# Patient Record
Sex: FEMALE | Race: BLACK OR AFRICAN AMERICAN | Employment: UNEMPLOYED | URBAN - METROPOLITAN AREA
[De-identification: names, ages, dates, MRNs, and addresses within clinical notes are randomized per-mention and may not be internally consistent; named-entity substitution may affect disease eponyms.]

---
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
## 2022-03-15 ENCOUNTER — OFFICE VISIT (OUTPATIENT)
Dept: FAMILY MEDICINE CLINIC | Age: 22
End: 2022-03-15
Payer: MEDICAID

## 2022-03-15 VITALS
OXYGEN SATURATION: 98 % | SYSTOLIC BLOOD PRESSURE: 99 MMHG | TEMPERATURE: 96.8 F | DIASTOLIC BLOOD PRESSURE: 59 MMHG | HEART RATE: 91 BPM | HEIGHT: 66 IN | RESPIRATION RATE: 16 BRPM | WEIGHT: 154 LBS | BODY MASS INDEX: 24.75 KG/M2

## 2022-03-15 DIAGNOSIS — Z23 ENCOUNTER FOR IMMUNIZATION: ICD-10-CM

## 2022-03-15 DIAGNOSIS — M54.9 ACUTE UPPER BACK PAIN: ICD-10-CM
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
## 2022-03-15 DIAGNOSIS — M54.2 NECK PAIN: Primary | ICD-10-CM

## 2022-03-15 PROCEDURE — 99213 OFFICE O/P EST LOW 20 MIN: CPT | Performed by: FAMILY MEDICINE

## 2022-03-15 PROCEDURE — 90746 HEPB VACCINE 3 DOSE ADULT IM: CPT | Performed by: FAMILY MEDICINE

## 2022-03-15 RX ORDER — CYCLOBENZAPRINE HCL 10 MG
TABLET ORAL
COMMUNITY
End: 2022-05-16

## 2022-03-15 RX ORDER — DICLOFENAC SODIUM 50 MG/1
TABLET, DELAYED RELEASE ORAL 2 TIMES DAILY
COMMUNITY
End: 2022-05-16
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
## 2022-03-17 ENCOUNTER — HOSPITAL ENCOUNTER (OUTPATIENT)
Dept: PHYSICAL THERAPY | Age: 22
Discharge: HOME | End: 2022-03-17
Payer: MEDICAID

## 2022-03-17 PROCEDURE — 97161 PT EVAL LOW COMPLEX 20 MIN: CPT | Performed by: PHYSICAL THERAPIST
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
## 2022-03-29 ENCOUNTER — HOSPITAL ENCOUNTER (OUTPATIENT)
Dept: PHYSICAL THERAPY | Age: 22
Discharge: HOME | End: 2022-03-29
Payer: MEDICAID

## 2022-03-29 PROCEDURE — 97014 ELECTRIC STIMULATION THERAPY: CPT

## 2022-03-29 PROCEDURE — 97110 THERAPEUTIC EXERCISES: CPT

## 2022-03-29 NOTE — PROGRESS NOTES
PT DAILY TREATMENT NOTE - [facility] 2-15    Patient Name: Yimi Joyner  Date:3/29/2022  : 2000  [x]  Patient  Verified  Payor: Jose Armando Varela / Plan: [employer] / Product Type: Managed Care Medicaid /    In time: 1:15P  Out time: 2:10P  Total Treatment Time (min): 55  Total Timed Codes (min): 45  1:1 Treatment Time ( only): --   Visit #:  2    Treatment Area: Neck pain [M54.2]  Other low back pain [M54.59]    SUBJECTIVE  Pain Level (0-10 scale): 5/10  Any medication changes, allergies to medications, adverse drug reactions, diagnosis change, or new procedure performed?: [x] No    [] Yes (see summary sheet for update)  Subjective functional status/changes:   [] No changes reported  Pt reported less pain since initial session.  Still feels really tight on R side of neck    OBJECTIVE    Modality rationale: decrease inflammation, decrease pain and increase tissue extensibility to improve the patients ability to decrease neck pain   Min Type Additional Details      10 [x] Estim: []Att   [x]Unatt    []TENS instruct                  [x]IFC  []Premod   []NMES                     []Other:  []w/US   []w/ice   [x]w/heat  Position:supine  Location:neck       []  Traction: [] Cervical       []Lumbar                       [] Prone          []Supine                       []Intermittent   []Continuous Lbs:  [] before manual  [] after manual  []w/heat    []  Ultrasound: []Continuous   [] Pulsed                       at: []1MHz   []3MHz Location:  W/cm2:    [] Paraffin         Location:   []w/heat    []  Ice     []  Heat  []  Ice massage Position:  Location:    []  Laser  []  Other: Position:  Location:      []  Vasopneumatic Device Pressure:       [] lo [] med [] hi   Temperature:      [x] Skin assessment post-treatment:  [x]intact []redness- no adverse reaction    []redness  adverse reaction:     45 min Therapeutic Exercise:  [x] See flow sheet :   Rationale: increase ROM, increase strength and improve coordination to improve the patients ability to increase function and mobility      With   [] TE   [] TA   [] Neuro   [] SC   [] other: Patient Education: [x] Review HEP    [] Progressed/Changed HEP based on:   [] positioning   [] body mechanics   [] transfers   [] heat/ice application    [] other:      Other Objective/Functional Measures: --     Pain Level (0-10 scale) post treatment: 0/10    ASSESSMENT/Changes in Function:   Pt tolerated session well. Advanced scapular stabilization exercises today. Advised pt she may be sore and management with ice/heat at home. Pt reported really liking the e-stim. Patient will continue to benefit from skilled PT services to modify and progress therapeutic interventions, address functional mobility deficits, address ROM deficits, address strength deficits, analyze and address soft tissue restrictions, analyze and cue movement patterns, analyze and modify body mechanics/ergonomics and assess and modify postural abnormalities to attain remaining goals. []  See Plan of Care  []  See progress note/recertification  []  See Discharge Summary         Progress towards goals / Updated goals:  Short Term Goals: To be accomplished in 2-4 treatments:               1. Pt will be compliant with initial HEP and PT attendance  Long Term Goals: To be accomplished in 10-12 treatments:               1. Pt will be able to use a vacuum  without increase in symptoms               2. Pt will be able to close at work without difficulty               3. Pt will be able to look back over her shoulder while driving without difficulty               4. Pt will be able to self-manage care using updated HEP for improved independence               5. Improved FOTO score to 72 or better to demonstrate improved function              Frequency / Duration: Patient to be seen 2 times per week for 10-12 treatments.     PLAN  [x]  Upgrade activities as tolerated     [x]  Continue plan of care  [] Update interventions per flow sheet       []  Discharge due to:_  []  Other:_      Francisco Chilel, PANCHO, OPTTOSHIA, AIB-VR 3/29/2022

## 2022-03-31 ENCOUNTER — HOSPITAL ENCOUNTER (OUTPATIENT)
Dept: PHYSICAL THERAPY | Age: 22
Discharge: HOME | End: 2022-03-31
Payer: MEDICAID

## 2022-03-31 PROCEDURE — 97110 THERAPEUTIC EXERCISES: CPT | Performed by: PHYSICAL THERAPIST

## 2022-03-31 PROCEDURE — 97014 ELECTRIC STIMULATION THERAPY: CPT | Performed by: PHYSICAL THERAPIST

## 2022-03-31 PROCEDURE — 97140 MANUAL THERAPY 1/> REGIONS: CPT | Performed by: PHYSICAL THERAPIST

## 2022-03-31 NOTE — PROGRESS NOTES
PT DAILY TREATMENT NOTE 2-15    Patient Name: Shantel Hawley  Date:3/31/2022  : 2000  [x]  Patient  Verified  Payor: Lupe Rogers / Plan: [employer] / Product Type: Managed Care Medicaid /    In time: 245p  Out time: 340p  Total Treatment Time (min): 55  Visit #:  3    Treatment Area: Neck pain [M54.2]  Other low back pain [M54.59]    SUBJECTIVE  Pain Level (0-10 scale): 4  Any medication changes, allergies to medications, adverse drug reactions, diagnosis change, or new procedure performed?: [x] No    [] Yes (see summary sheet for update)  Subjective functional status/changes:   [] No changes reported  Pain is still back and forth, but not as bad today. OBJECTIVE    Modality rationale: decrease inflammation, decrease pain and increase tissue extensibility to improve the patients ability to decrease neck pain    Min Type Additional Details      15 [x]? Estim: []? Att   [x]? Unatt    []? TENS instruct                  [x]?IFC  []? Premod   []? NMES                     []?Other:  []?w/US   []?w/ice   [x]?w/heat  Position:supine  Location:neck        []? Traction: []? Cervical       []? Lumbar                       []? Prone          []? Supine                       []?Intermittent   []? Continuous Lbs:  []? before manual  []? after manual  []?w/heat     []? Ultrasound: []? Continuous   []? Pulsed                       at: []?1MHz   []? 3MHz Location:  W/cm2:     []? Paraffin         Location:   []?w/heat     []? Ice     []? Heat  []? Ice massage Position:  Location:     []? Laser  []? Other: Position:  Location:        []? Vasopneumatic Device Pressure:       []? lo []? med []? hi   Temperature:       [x]? Skin assessment post-treatment:  [x]? intact []? redness- no adverse reaction    []? redness  adverse reaction:      32 min Therapeutic Exercise:  [x]?  See flow sheet :   Rationale: increase ROM, increase strength and improve coordination to improve the patients ability to increase function and mobility    8 min Manual Therapy: cervical distraction, upper trap, cervical paraspinal STM    Rationale: decrease pain, increase ROM, increase tissue extensibility and decrease trigger points to improve the patients ability to work without pain     With   []? TE   []? TA   []? Neuro   []? SC   []? other: Patient Education: [x]? Review HEP    []? Progressed/Changed HEP based on:   []? positioning   []? body mechanics   []? transfers   []? heat/ice application    []? other:       Other Objective/Functional Measures: --        Pain Level (0-10 scale) post treatment: 0/10     ASSESSMENT/Changes in Function:   Making good progress thus far. Gave her red and yellow TB in order to have her continue her exercises at home. Patient will continue to benefit from skilled PT services to modify and progress therapeutic interventions, address functional mobility deficits, address ROM deficits, address strength deficits, analyze and address soft tissue restrictions, analyze and cue movement patterns, analyze and modify body mechanics/ergonomics and assess and modify postural abnormalities to attain remaining goals. []? See Plan of Care  []? See progress note/recertification  []? See Discharge Summary         Progress towards goals / Updated goals:  Short Term Goals: To be accomplished in 2-4 treatments:               1. Pt will be compliant with initial HEP and PT attendance MET  Long Term Goals: To be accomplished in 10-12 treatments:               1. Pt will be able to use a vacuum  without increase in symptoms               2. Pt will be able to close at work without difficulty               3. Pt will be able to look back over her shoulder while driving without difficulty               4. Pt will be able to self-manage care using updated HEP for improved independence               5.  Improved FOTO score to 72 or better to demonstrate improved function              Frequency / Duration: Patient to be seen 2 times per week for 10-12 treatments.     PLAN  [x]? Upgrade activities as tolerated     [x]? Continue plan of care  []? Update interventions per flow sheet       []? Discharge due to:_  []?   Other:_        Tahir Todd, PT, DPT 3/31/2022

## 2022-04-05 ENCOUNTER — HOSPITAL ENCOUNTER (OUTPATIENT)
Dept: PHYSICAL THERAPY | Age: 22
Discharge: HOME | End: 2022-04-05
Payer: MEDICAID

## 2022-04-05 PROCEDURE — 97110 THERAPEUTIC EXERCISES: CPT | Performed by: PHYSICAL THERAPIST

## 2022-04-05 PROCEDURE — 97014 ELECTRIC STIMULATION THERAPY: CPT | Performed by: PHYSICAL THERAPIST

## 2022-04-05 NOTE — PROGRESS NOTES
PT DAILY TREATMENT NOTE 2-15    Patient Name: Galen Brenner  Date:2022  : 2000  [x]  Patient  Verified  Payor: Violeta Donis / Plan: [facility] MEDICAID / Product Type: Managed Care Medicaid /    In time: 128p  Out time: 223p  Total Treatment Time (min): 55  Visit #:  4    Treatment Area: Neck pain [M54.2]  Other low back pain [M54.59]    SUBJECTIVE  Pain Level (0-10 scale): 0  Any medication changes, allergies to medications, adverse drug reactions, diagnosis change, or new procedure performed?: [x] No    [] Yes (see summary sheet for update)  Subjective functional status/changes:   [] No changes reported  Doing better overall. Making progress. OBJECTIVE  Modality rationale: decrease inflammation, decrease pain and increase tissue extensibility to improve the patients ability to decrease neck pain     Min Type Additional Details      15 [x]? ? Estim: []??Att   [x]? ? Unatt    []? ?TENS instruct                  [x]? ?IFC  []? ?Premod   []? ?NMES                     []? ? Other:  []??w/US   []? ?w/ice   [x]? ?w/heat  Position:supine  Location:neck        []? ?  Traction: []?? Cervical       []? ?Lumbar                       []? ? Prone          []? ?Supine                       []? ?Intermittent   []? ? Continuous Lbs:  []?? before manual  []? ? after manual  []? ?w/heat     []? ?  Ultrasound: []??Continuous   []? ? Pulsed                       EK: []??1MHz   []? ? 3MHz Location:  W/cm2:     []? ? Paraffin         Location:   []??w/heat     []? ?  Ice     []? ?  Heat  []? ?  Ice massage Position:  Location:     []? ?  Laser  []? ?  Other: Position:  Location:        []? ?  Vasopneumatic Device Pressure:       []? ? lo []? ? med []?? hi   Temperature:       [x]? ? Skin assessment post-treatment:  [x]? ? intact []? ?redness- no adverse reaction    []? ?redness  adverse reaction:      40 min Therapeutic Exercise:  [x]? ? See flow sheet :   Rationale: increase ROM, increase strength and improve coordination to improve the patients ability to increase function and mobility     With   []?? TE   []?? TA   []? ? Neuro   []?? SC   []?? other: Patient Education: [x]? ? Review HEP    []? ? Progressed/Changed HEP based on:   []?? positioning   []? ? body mechanics   []? ? transfers   []? ? heat/ice application    []? ? other:       Other Objective/Functional Measures: --        Pain Level (0-10 scale) post treatment: 0/10     ASSESSMENT/Changes in Function:   Added scalene stretch today. Adding in new exercises without difficulty. Patient will continue to benefit from skilled PT services to modify and progress therapeutic interventions, address functional mobility deficits, address ROM deficits, address strength deficits, analyze and address soft tissue restrictions, analyze and cue movement patterns, analyze and modify body mechanics/ergonomics and assess and modify postural abnormalities to attain remaining goals.     []? ?  See Plan of Care  []? ?  See progress note/recertification  []? ?  See Discharge Summary         Progress towards goals / Updated goals:  Short Term Goals: To be accomplished in 2-4 treatments:               1. Pt will be compliant with initial HEP and PT attendance MET  Long Term Goals: To be accomplished in 10-12 treatments:               1. Pt will be able to use a vacuum  without increase in symptoms               2. Pt will be able to close at work without difficulty               3. Pt will be able to look back over her shoulder while driving without difficulty               4. Pt will be able to self-manage care using updated HEP for improved independence               5. Improved FOTO score to 72 or better to demonstrate improved function              Frequency / Duration: Patient to be seen 2 times per week for 10-12 treatments.     PLAN  [x]? ?  Upgrade activities as tolerated     [x]? ?  Continue plan of care  []? ?  Update interventions per flow sheet       []? ?  Discharge due to:_  []??  Other:Edil Johnson Linda Brito, DPT 4/5/2022

## 2022-04-07 ENCOUNTER — HOSPITAL ENCOUNTER (OUTPATIENT)
Dept: PHYSICAL THERAPY | Age: 22
Discharge: HOME | End: 2022-04-07
Payer: MEDICAID

## 2022-04-07 PROCEDURE — 97140 MANUAL THERAPY 1/> REGIONS: CPT | Performed by: PHYSICAL THERAPIST

## 2022-04-07 PROCEDURE — 97014 ELECTRIC STIMULATION THERAPY: CPT | Performed by: PHYSICAL THERAPIST

## 2022-04-07 PROCEDURE — 97110 THERAPEUTIC EXERCISES: CPT | Performed by: PHYSICAL THERAPIST

## 2022-04-07 NOTE — PROGRESS NOTES
PT DAILY TREATMENT NOTE 2-15    Patient Name: Ellen Vuong  Date:2022  : 2000  [x]  Patient  Verified  Payor: Eros Paulson / Plan: Connie Abad / Product Type: Managed Care Medicaid /    In time: 9102S  Out time: 154p  Total Treatment Time (min): 59  Visit #:  5    Treatment Area: Neck pain [M54.2]  Other low back pain [M54.59]    SUBJECTIVE  Pain Level (0-10 scale): 3  Any medication changes, allergies to medications, adverse drug reactions, diagnosis change, or new procedure performed?: [x] No    [] Yes (see summary sheet for update)  Subjective functional status/changes:   [] No changes reported  Back was bothering her more, particularly on the R side the other day. Not as bad today. OBJECTIVE      Modality rationale: decrease inflammation, decrease pain and increase tissue extensibility to improve the patients ability to decrease neck pain     Min Type Additional Details      10 [x]? ?? Estim: []? ? ? Att   [x]? ? ? Unatt    []? ??TENS instruct                  [x]? ?? IFC  []? ? ?Premod   []? ??NMES                     []? ??Other:  []???w/US   []? ??w/ice   [x]? ??w/heat  Position:supine  Location:neck        []? ??  Traction: []??? Cervical       []? ? ?Lumbar                       []? ?? Prone          []? ? ?Supine                       []? ? ? Intermittent   []? ?? Continuous Lbs:  []??? before manual  []? ?? after manual  []? ??w/heat     []? ??  Ultrasound: []? ? ? Continuous   []? ?? Pulsed                       at: []???1MHz   []? ??3MHz Location:  W/cm2:     []??? Paraffin         Location:   []???w/heat     []? ??  Ice     []? ??  Heat  []? ??  Ice massage Position:  Location:     []? ??  Laser  []? ??  Other: Position:  Location:        []? ??  Vasopneumatic Device Pressure:       []? ?? lo []? ?? med []? ?? hi   Temperature:       [x]? ?? Skin assessment post-treatment:  [x]? ??intact []? ??redness- no adverse reaction    []? ??redness  adverse reaction:      39 min Therapeutic Exercise:  [x]? ?? See flow sheet :   Rationale: increase ROM, increase strength and improve coordination to improve the patients ability to increase function and mobility    10 min Manual Therapy: cervical distraction, upper trap, cervical paraspinal STM, downsloping glides mid and lower R side, upsloping glides mid L side    Rationale: decrease pain, increase ROM, increase tissue extensibility and decrease trigger points to improve the patients ability to work without pain     With   []??? TE   []??? TA   []??? Neuro   []??? SC   []? ?? other: Patient Education: [x]??? Review HEP    []? ?? Progressed/Changed HEP based on:   []??? positioning   []? ?? body mechanics   []? ?? transfers   []? ?? heat/ice application    []? ?? other:       Other Objective/Functional Measures: --        Pain Level (0-10 scale) post treatment: 0/10     ASSESSMENT/Changes in Function:   Felt good stretch with the QL doorway stretch, so will have her practice at home. Some possible facet joint dysfunction in mid L cervical segments, and will have her work on the towel rotation stretch at home. Patient will continue to benefit from skilled PT services to modify and progress therapeutic interventions, address functional mobility deficits, address ROM deficits, address strength deficits, analyze and address soft tissue restrictions, analyze and cue movement patterns, analyze and modify body mechanics/ergonomics and assess and modify postural abnormalities to attain remaining goals.     []? ??  See Plan of Care  []? ??  See progress note/recertification  []? ??  See Discharge Summary         Progress towards goals / Updated goals:  Short Term Goals: To be accomplished in 2-4 treatments:               1. Pt will be compliant with initial HEP and PT attendance MET  Long Term Goals: To be accomplished in 10-12 treatments:               1.  Pt will be able to use a vacuum  without increase in symptoms               2. Pt will be able to close at work without difficulty PROGRESSING               3. Pt will be able to look back over her shoulder while driving without difficulty               4. Pt will be able to self-manage care using updated HEP for improved independence               5. Improved FOTO score to 72 or better to demonstrate improved function              Frequency / Duration: Patient to be seen 2 times per week for 10-12 treatments.     PLAN  [x]???  Upgrade activities as tolerated     [x]? ??  Continue plan of care  []? ??  Update interventions per flow sheet       []???  Discharge due to:_  []???  Other:_          Neli Jacob, PT, DPT 4/7/2022

## 2022-04-14 ENCOUNTER — HOSPITAL ENCOUNTER (OUTPATIENT)
Dept: PHYSICAL THERAPY | Age: 22
Discharge: HOME | End: 2022-04-14
Payer: MEDICAID

## 2022-04-14 PROCEDURE — 97140 MANUAL THERAPY 1/> REGIONS: CPT | Performed by: PHYSICAL THERAPIST

## 2022-04-14 PROCEDURE — 97014 ELECTRIC STIMULATION THERAPY: CPT | Performed by: PHYSICAL THERAPIST

## 2022-04-14 PROCEDURE — 97110 THERAPEUTIC EXERCISES: CPT | Performed by: PHYSICAL THERAPIST

## 2022-04-14 NOTE — PROGRESS NOTES
PT DAILY TREATMENT NOTE 2-15    Patient Name: Benton Soto  KWJQ:  : 2000  [x]  Patient  Verified  Payor: Ramez Gonzalez / Plan: Baron Viveros / Product Type: Managed Care Medicaid /    In time: 119p  Out time: 215p  Total Treatment Time (min): 56  Visit #:  6    Treatment Area: Neck pain [M54.2]  Other low back pain [M54.59]    SUBJECTIVE  Pain Level (0-10 scale): 2  Any medication changes, allergies to medications, adverse drug reactions, diagnosis change, or new procedure performed?: [x] No    [] Yes (see summary sheet for update)  Subjective functional status/changes:   [] No changes reported  Feeling better. She is having less pain day over day. OBJECTIVE    Modality rationale: decrease inflammation, decrease pain and increase tissue extensibility to improve the patients ability to decrease neck pain     Min Type Additional Details      15 [x]???? Estim: []?? ?? Att   [x]? ???Unatt    []????TENS instruct                  [x]? ???IFC  []????Premod   []????NMES                     []?? ??Other:  []????w/US   []? ???w/ice   [x]? ???w/heat  Position:supine  Location:neck        []????  Traction: []???? Cervical       []????Lumbar                       []???? Prone          []????Supine                       []?? ?? Intermittent   []???? Continuous Lbs:  []???? before manual  []???? after manual  []? ???w/heat     []????  Ultrasound: []???? Continuous   []???? Pulsed                       at: []????1MHz   []????3MHz Location:  W/cm2:     []???? Paraffin         Location:   []????w/heat     []????  Ice     []????  Heat  []????  Ice massage Position:  Location:     []????  Laser  []????  Other: Position:  Location:        []????  Vasopneumatic Device Pressure:       []???? lo []???? med []???? hi   Temperature:       [x]? ??? Skin assessment post-treatment:  [x]? ???intact []? ???redness- no adverse reaction    []? ???redness  adverse reaction:      31 min Therapeutic Exercise:  [x]? ??? See flow sheet :   Rationale: increase ROM, increase strength and improve coordination to improve the patients ability to increase function and mobility     10 min Manual Therapy: cervical distraction, upper trap, cervical paraspinal STM, downsloping glides mid and lower R side, upsloping glides mid L side    Rationale: decrease pain, increase ROM, increase tissue extensibility and decrease trigger points to improve the patients ability to work without pain      With   []???? TE   []???? TA   []???? Neuro   []???? SC   []???? other: Patient Education: [x]???? Review HEP    []???? Progressed/Changed HEP based on:   []???? positioning   []???? body mechanics   []???? transfers   []???? heat/ice application    []???? other:       Other Objective/Functional Measures: Rotation: 70deg bilaterally        Pain Level (0-10 scale) post treatment: 0/10     ASSESSMENT/Changes in Function:   Showing good improvements overall. Will decrease frequency to 1x/weekly starting in 2 weeks for 2 further appointments. Patient will continue to benefit from skilled PT services to modify and progress therapeutic interventions, address functional mobility deficits, address ROM deficits, address strength deficits, analyze and address soft tissue restrictions, analyze and cue movement patterns, analyze and modify body mechanics/ergonomics and assess and modify postural abnormalities to attain remaining goals.     []????  See Plan of Care  []????  See progress note/recertification  []????  See Discharge Summary         Progress towards goals / Updated goals:  Short Term Goals: To be accomplished in 2-4 treatments:               1. Pt will be compliant with initial HEP and PT attendance MET  Long Term Goals: To be accomplished in 10-12 treatments:               1.  Pt will be able to use a vacuum  without increase in symptoms               2. Pt will be able to close at work without difficulty ([phone]. Pt will be able to look back over her shoulder while driving without difficulty MET               4. Pt will be able to self-manage care using updated HEP for improved independence MET               5. Improved FOTO score to 72 or better to demonstrate improved function              Frequency / Duration: Patient to be seen 2 times per week for 10-12 treatments.     PLAN  [x]????  Upgrade activities as tolerated     [x]? ???  Continue plan of care  []????  Update interventions per flow sheet       []????  Discharge due to:_  []????  Other:_          Myriam Coronado, PT, DPT 4/14/2022

## 2022-04-26 ENCOUNTER — HOSPITAL ENCOUNTER (OUTPATIENT)
Dept: PHYSICAL THERAPY | Age: 22
Discharge: HOME | End: 2022-04-26
Payer: MEDICAID

## 2022-04-26 PROCEDURE — 97110 THERAPEUTIC EXERCISES: CPT | Performed by: PHYSICAL THERAPIST

## 2022-04-26 PROCEDURE — 97014 ELECTRIC STIMULATION THERAPY: CPT | Performed by: PHYSICAL THERAPIST

## 2022-04-26 PROCEDURE — 97140 MANUAL THERAPY 1/> REGIONS: CPT | Performed by: PHYSICAL THERAPIST

## 2022-04-26 NOTE — PROGRESS NOTES
PT DAILY TREATMENT NOTE 2-15    Patient Name: Pablo Apley  Date:2022  : 2000  [x]  Patient  Verified  Payor: Santa Lazcano / Plan: [id number] [employer] / Product Type: Managed Care Medicaid /    In time: 517  Out time:   Total Treatment Time (min): 63  Visit #:  7    Treatment Area: Neck pain [M54.2]  Other low back pain [M54.59]    SUBJECTIVE  Pain Level (0-10 scale): 2  Any medication changes, allergies to medications, adverse drug reactions, diagnosis change, or new procedure performed?: [x] No    [] Yes (see summary sheet for update)  Subjective functional status/changes:   [] No changes reported  About 80-90% better at this time. OBJECTIVE    Modality rationale: decrease inflammation, decrease pain and increase tissue extensibility to improve the patients ability to decrease neck pain     Min Type Additional Details      15 [x]????? Estim: []??? ? ? Att   [x]??? ?? Unatt    []?????TENS instruct                  [x]??? ? ?IFC  []??? ? ? Premod   []??? ??NMES                     []??? ?? Other:  []?????w/US   []?????w/ice   [x]?????w/heat  Position:supine  Location:neck        []?????  Traction: []????? Cervical       []??? ? ?Lumbar                       []????? Prone          []??? ? ? Supine                       []??? ? ? Intermittent   []??? ? ? Continuous Lbs:  []????? before manual  []????? after manual  []?????w/heat     []?????  Ultrasound: []??? ? ? Continuous   []????? Pulsed                       at: []?????1MHz   []?????3MHz Location:  W/cm2:     []????? Paraffin         Location:   []?????w/heat     []?????  Ice     []?????  Heat  []?????  Ice massage Position:  Location:     []?????  Laser  []?????  Other: Position:  Location:        []?????  Vasopneumatic Device Pressure:       []????? lo []????? med []????? hi   Temperature:       [x]????? Skin assessment post-treatment:  [x]? ????intact []?????redness- no adverse reaction    []?????redness  adverse reaction:      38 min Therapeutic Exercise:  [x]? ???? See flow sheet :   Rationale: increase ROM, increase strength and improve coordination to improve the patients ability to increase function and mobility     10 min Manual Therapy: cervical distraction, upper trap, cervical paraspinal STM, downsloping glides mid and lower R side, upsloping glides mid L side    Rationale: decrease pain, increase ROM, increase tissue extensibility and decrease trigger points to improve the patients ability to work without pain      With   []????? TE   []????? TA   []????? Neuro   []????? SC   []????? other: Patient Education: [x]????? Review HEP    []????? Progressed/Changed HEP based on:   []????? positioning   []????? body mechanics   []????? transfers   []????? heat/ice application    []????? other:       Other Objective/Functional Measures: ---      Pain Level (0-10 scale) post treatment: 0/10     ASSESSMENT/Changes in Function:   Continuing to do well; will plan to d/c after next session. Patient will continue to benefit from skilled PT services to modify and progress therapeutic interventions, address functional mobility deficits, address ROM deficits, address strength deficits, analyze and address soft tissue restrictions, analyze and cue movement patterns, analyze and modify body mechanics/ergonomics and assess and modify postural abnormalities to attain remaining goals.     []?????  See Plan of Care  []?????  See progress note/recertification  []?????  See Discharge Summary         Progress towards goals / Updated goals:  Short Term Goals: To be accomplished in 2-4 treatments:               1. Pt will be compliant with initial HEP and PT attendance MET  Long Term Goals: To be accomplished in 10-12 treatments:               1.  Pt will be able to use a vacuum  without increase in symptoms               2. Pt will be able to close at work without difficulty MET  [id number]. Pt will be able to look back over her shoulder while driving without difficulty MET               4. Pt will be able to self-manage care using updated HEP for improved independence MET               5.  Improved FOTO score to 72 or better to demonstrate improved function              Frequency / Duration: Patient to be seen 2 times per week for 10-12 treatments.     PLAN  [x]?????  Upgrade activities as tolerated     [x]?????  Continue plan of care  []?????  Update interventions per flow sheet       []?????  Discharge due to:_  []?????  Other:_             Connie Decker, PT, DPT 4/26/2022

## 2022-05-03 ENCOUNTER — HOSPITAL ENCOUNTER (OUTPATIENT)
Dept: PHYSICAL THERAPY | Age: 22
Discharge: HOME | End: 2022-05-03
Payer: MEDICAID

## 2022-05-03 PROCEDURE — 97110 THERAPEUTIC EXERCISES: CPT | Performed by: PHYSICAL THERAPIST

## 2022-05-03 PROCEDURE — 97014 ELECTRIC STIMULATION THERAPY: CPT | Performed by: PHYSICAL THERAPIST

## 2022-05-03 PROCEDURE — 97140 MANUAL THERAPY 1/> REGIONS: CPT | Performed by: PHYSICAL THERAPIST
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
## 2022-05-03 NOTE — PROGRESS NOTES
PT DAILY TREATMENT NOTE 2-15    Patient Name: Lele Ball  Date:5/3/2022  : 2000  [x]  Patient  Verified  Payor: Adam Comment / Plan: [id number] [employer] / Product Type: Managed Care Medicaid /    In time: 114p  Out time: 204p  Total Treatment Time (min): 50  Visit #:  8    Treatment Area: Neck pain [M54.2]  Other low back pain [M54.59]    SUBJECTIVE  Pain Level (0-10 scale): 0  Any medication changes, allergies to medications, adverse drug reactions, diagnosis change, or new procedure performed?: [x] No    [] Yes (see summary sheet for update)  Subjective functional status/changes:   [] No changes reported  Doing good. No pain over her vacation over the weekend, even at the water park and zipline. OBJECTIVE         Modality rationale: decrease inflammation, decrease pain and increase tissue extensibility to improve the patients ability to decrease neck pain     Min Type Additional Details      15 [x]?????? Estim: []???? ? ? Att   [x]???? ?? Unatt    []??????TENS instruct                  [x]??????IFC  []?????? Premod   []??????NMES                     []?????? Other:  []??????w/US   []??????w/ice   [x]??????w/heat  Position:supine  Location:neck        []??????  Traction: []?????? Cervical       []??????Lumbar                       []?????? Prone          []?????? Supine                       []???? ? ? Intermittent   []?????? Continuous Lbs:  []?????? before manual  []?????? after manual  []??????w/heat     []??????  Ultrasound: []?????? Continuous   []?????? Pulsed                       at: []??????1MHz   []??????3MHz Location:  W/cm2:     []?????? Paraffin         Location:   []??????w/heat     []??????  Ice     []??????  Heat  []??????  Ice massage Position:  Location:     []??????  Laser  []??????  Other: Position:  Location:        []??????  Vasopneumatic Device Pressure:       []?????? lo []?????? med []?????? hi   Temperature:       [x]?????? Skin assessment post-treatment:  [x]??????intact []??????redness- no adverse reaction    []??????redness  adverse reaction:      25 min Therapeutic Exercise:  [x]?????? See flow sheet :   Rationale: increase ROM, increase strength and improve coordination to improve the patients ability to increase function and mobility     10 min Manual Therapy: cervical distraction, upper trap, cervical paraspinal STM, downsloping glides mid and lower R side, upsloping glides mid L side    Rationale: decrease pain, increase ROM, increase tissue extensibility and decrease trigger points to improve the patients ability to work without pain      With   []?????? TE   []?????? TA   []?????? Neuro   []?????? SC   []?????? other: Patient Education: [x]?????? Review HEP    []?????? Progressed/Changed HEP based on:   []?????? positioning   []?????? body mechanics   []?????? transfers   []?????? heat/ice application    []?????? other:       Other Objective/Functional Measures: FOTO: 78     Pain Level (0-10 scale) post treatment: 0/10     ASSESSMENT/Changes in Function:   []??????  See progress note/recertification  [x]??????  See Discharge Summary         PLAN  []??????  Upgrade activities as tolerated     []??????  Continue plan of care  []??????  Update interventions per flow sheet       [x]??????  Discharge due to: progress  []??????  Other:Edil Quinteros Kirk, PT, DPT 5/3/2022

## 2022-05-16 ENCOUNTER — OFFICE VISIT (OUTPATIENT)
Dept: FAMILY MEDICINE CLINIC | Age: 22
End: 2022-05-16
Payer: MEDICAID

## 2022-05-16 VITALS
SYSTOLIC BLOOD PRESSURE: 106 MMHG | WEIGHT: 154 LBS | OXYGEN SATURATION: 96 % | HEART RATE: 71 BPM | HEIGHT: 66 IN | DIASTOLIC BLOOD PRESSURE: 64 MMHG | RESPIRATION RATE: 16 BRPM | BODY MASS INDEX: 24.75 KG/M2 | TEMPERATURE: 98.3 F

## 2022-05-16 DIAGNOSIS — Z78.9 NOT IMMUNE TO HEPATITIS B VIRUS: ICD-10-CM

## 2022-05-16 DIAGNOSIS — Z13.1 SCREENING FOR DIABETES MELLITUS: ICD-10-CM

## 2022-05-16 DIAGNOSIS — N94.6 DYSMENORRHEA IN ADOLESCENT: ICD-10-CM

## 2022-05-16 DIAGNOSIS — Z13.6 SCREENING FOR CARDIOVASCULAR CONDITION: ICD-10-CM

## 2022-05-16 DIAGNOSIS — E55.9 VITAMIN D DEFICIENCY: Primary | ICD-10-CM

## 2022-05-16 PROCEDURE — 99213 OFFICE O/P EST LOW 20 MIN: CPT | Performed by: FAMILY MEDICINE
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
## 2022-05-16 NOTE — PROGRESS NOTES
2022   Bibiana Erickson (: 2000) is a 25 y.o. female, established patient, here for evaluation of the following chief complaint(s):  Follow-up ([facility]) and Labs (Need to have Hep B test done- 2 month f/u visit )     ASSESSMENT/PLAN:  Below is the assessment and plan developed based on review of pertinent history, physical exam, labs, studies, and medications. 1. Vitamin D deficiency  -     VITAMIN D, 25 HYDROXY; Future  2. Dysmenorrhea in adolescent  -     THYROID CASCADE PROFILE; Future  -     URINALYSIS W/ REFLEX CULTURE; Future  -     CBC WITH AUTOMATED DIFF; Future  -     METABOLIC PANEL, COMPREHENSIVE; Future  3. Screening for cardiovascular condition  -     LIPID PANEL; Future  4. Screening for diabetes mellitus  -     METABOLIC PANEL, COMPREHENSIVE; Future  5. Not immune to hepatitis B virus  -     HEPATITIS B SURF AB QUANT    Return in about 1 year (around 2023) for follow up. SUBJECTIVE/OBJECTIVE:  HPI   1. Vitamin D deficiency  Check vitamin D levels    2. Dysmenorrhea in adolescent  Patient continues to have dysmenorrhea and heavy cycle. She has stopped taking OCPs. LMP: 2022. I have advised her to start iron supplement. I will check labs. 3. Screening for cardiovascular condition  Check cholesterol    4. Screening for diabetes mellitus  Check labs    5. Not immune to hepatitis B virus  Patient had 3 series of hepatitis B vaccine, after that had hepatitis B immunity was low, she had a hepatitis B booster. She is here to get hepatitis B immunity checked again. No results found for any visits on 22. Review of Systems   Constitutional: Negative. HENT: Negative. Eyes: Negative. Respiratory: Negative. Cardiovascular: Negative. Gastrointestinal: Negative. Genitourinary: Negative. Musculoskeletal: Negative. Skin: Negative. Neurological: Negative. Endo/Heme/Allergies: Negative. Psychiatric/Behavioral: Negative.         Physical Exam  Vitals and nursing note reviewed. HENT:      Head: Normocephalic and atraumatic. Right Ear: External ear normal.      Left Ear: External ear normal.      Nose: Nose normal.   Eyes:      Conjunctiva/sclera: Conjunctivae normal.   Cardiovascular:      Rate and Rhythm: Normal rate and regular rhythm. Pulmonary:      Effort: Pulmonary effort is normal.      Breath sounds: Normal breath sounds. Abdominal:      General: Bowel sounds are normal. There is no distension. Palpations: Abdomen is soft. Tenderness: There is no abdominal tenderness. Musculoskeletal:         General: Normal range of motion. Cervical back: Normal range of motion and neck supple. Right lower leg: No edema. Left lower leg: No edema. Skin:     General: Skin is warm and dry. Neurological:      General: No focal deficit present. Mental Status: She is alert. /64 (BP 1 Location: Left upper arm, BP Patient Position: Sitting, BP Cuff Size: Adult)   Pulse 71   Temp 98.3 °F (36.8 °C) (Temporal)   Resp 16   Ht 5' 6\" (1.676 m)   Wt 154 lb (69.9 kg)   LMP 05/02/2022   SpO2 96%   BMI 24.86 kg/m²     Allergies   Allergen Reactions    Pollens Extract Itching       No current outpatient medications on file. No current facility-administered medications for this visit. Past Medical History:   Diagnosis Date    Other ill-defined conditions(799.89)     bronchitis       No past surgical history on file. Social History:  reports that she has never smoked. She has never used smokeless tobacco. She reports that she does not drink alcohol and does not use drugs. Patient Care Team:  Merly Goodson MD as PCP - General (Family Medicine)  Merly Goodson MD as PCP - [facility] Provider    Problem List  Date Reviewed: 5/16/2022    None               I ADVISED PATIENT TO GO TO ER IF SYMPTOMS WORSEN , CHANGE OR FAILS TO IMPROVE.     I have discussed the diagnosis with the patient and the intended plan as seen in the above orders. The patient has received an after-visit summary and questions were answered concerning future plans. I have discussed medication side effects and warnings with the patient as well. The patient agrees and understands above plan. An electronic signature was used to authenticate this note.   -- Tee Ulloa MD

## 2022-05-17 LAB
25(OH)D3+25(OH)D2 SERPL-MCNC: 33.1 NG/ML (ref 30–100)
ALBUMIN SERPL-MCNC: 3.8 G/DL (ref 3.9–5)
ALBUMIN/GLOB SERPL: 1 {RATIO} (ref 1.2–2.2)
ALP SERPL-CCNC: 70 IU/L (ref 42–106)
ALT SERPL-CCNC: 10 IU/L (ref 0–32)
APPEARANCE UR: CLEAR
AST SERPL-CCNC: 14 IU/L (ref 0–40)
BACTERIA #/AREA URNS HPF: NORMAL /[HPF]
BASOPHILS # BLD AUTO: 0 X10E3/UL (ref 0–0.2)
BASOPHILS NFR BLD AUTO: 1 %
BILIRUB SERPL-MCNC: 0.8 MG/DL (ref 0–1.2)
BILIRUB UR QL STRIP: NEGATIVE
BUN SERPL-MCNC: 5 MG/DL (ref 6–20)
BUN/CREAT SERPL: 8 (ref 9–23)
CALCIUM SERPL-MCNC: 9.8 MG/DL (ref 8.7–10.2)
CASTS URNS QL MICRO: NORMAL /LPF
CHLORIDE SERPL-SCNC: 100 MMOL/L (ref 96–106)
CHOLEST SERPL-MCNC: 161 MG/DL (ref 100–169)
CO2 SERPL-SCNC: 23 MMOL/L (ref 20–29)
COLOR UR: YELLOW
CREAT SERPL-MCNC: 0.59 MG/DL (ref 0.57–1)
EGFR: 133 ML/MIN/1.73
EOSINOPHIL # BLD AUTO: 0.1 X10E3/UL (ref 0–0.4)
EOSINOPHIL NFR BLD AUTO: 2 %
EPI CELLS #/AREA URNS HPF: NORMAL /HPF (ref 0–10)
ERYTHROCYTE [DISTWIDTH] IN BLOOD BY AUTOMATED COUNT: 13.1 % (ref 11.7–15.4)
GLOBULIN SER CALC-MCNC: 3.9 G/DL (ref 1.5–4.5)
GLUCOSE SERPL-MCNC: 78 MG/DL (ref 65–99)
GLUCOSE UR QL STRIP: NEGATIVE
HBV SURFACE AB SER-ACNC: 23.1 MIU/ML
HCT VFR BLD AUTO: 41.7 % (ref 34–46.6)
HDLC SERPL-MCNC: 77 MG/DL
HGB BLD-MCNC: 13.9 G/DL (ref 11.1–15.9)
HGB UR QL STRIP: NEGATIVE
IMM GRANULOCYTES # BLD AUTO: 0 X10E3/UL (ref 0–0.1)
IMM GRANULOCYTES NFR BLD AUTO: 0 %
KETONES UR QL STRIP: ABNORMAL
LDLC SERPL CALC-MCNC: 74 MG/DL (ref 0–109)
LEUKOCYTE ESTERASE UR QL STRIP: NEGATIVE
LYMPHOCYTES # BLD AUTO: 2.6 X10E3/UL (ref 0.7–3.1)
LYMPHOCYTES NFR BLD AUTO: 52 %
MCH RBC QN AUTO: 28.7 PG (ref 26.6–33)
MCHC RBC AUTO-ENTMCNC: 33.3 G/DL (ref 31.5–35.7)
MCV RBC AUTO: 86 FL (ref 79–97)
MICRO URNS: ABNORMAL
MICRO URNS: ABNORMAL
MONOCYTES # BLD AUTO: 0.3 X10E3/UL (ref 0.1–0.9)
MONOCYTES NFR BLD AUTO: 6 %
NEUTROPHILS # BLD AUTO: 2 X10E3/UL (ref 1.4–7)
NEUTROPHILS NFR BLD AUTO: 39 %
NITRITE UR QL STRIP: NEGATIVE
PH UR STRIP: 7 [PH] (ref 5–7.5)
PLATELET # BLD AUTO: 270 X10E3/UL (ref 150–450)
POTASSIUM SERPL-SCNC: 4 MMOL/L (ref 3.5–5.2)
PROT SERPL-MCNC: 7.7 G/DL (ref 6–8.5)
PROT UR QL STRIP: NEGATIVE
RBC # BLD AUTO: 4.84 X10E6/UL (ref 3.77–5.28)
RBC #/AREA URNS HPF: NORMAL /HPF (ref 0–2)
SODIUM SERPL-SCNC: 137 MMOL/L (ref 134–144)
SP GR UR STRIP: 1.02 (ref 1–1.03)
TRIGL SERPL-MCNC: 49 MG/DL (ref 0–89)
TSH SERPL DL<=0.005 MIU/L-ACNC: 1.34 UIU/ML (ref 0.45–4.5)
URINALYSIS REFLEX, 377202: ABNORMAL
UROBILINOGEN UR STRIP-MCNC: 1 MG/DL (ref 0.2–1)
VLDLC SERPL CALC-MCNC: 10 MG/DL (ref 5–40)
WBC # BLD AUTO: 5 X10E3/UL (ref 3.4–10.8)
WBC #/AREA URNS HPF: NORMAL /HPF (ref 0–5)

## 2022-11-16 ENCOUNTER — OFFICE VISIT (OUTPATIENT)
Dept: FAMILY MEDICINE CLINIC | Age: 22
End: 2022-11-16
Payer: MEDICAID

## 2022-11-16 VITALS
HEIGHT: 66 IN | HEART RATE: 68 BPM | RESPIRATION RATE: 20 BRPM | BODY MASS INDEX: 24.85 KG/M2 | TEMPERATURE: 98.2 F | WEIGHT: 154.6 LBS | DIASTOLIC BLOOD PRESSURE: 65 MMHG | OXYGEN SATURATION: 98 % | SYSTOLIC BLOOD PRESSURE: 103 MMHG

## 2022-11-16 DIAGNOSIS — M62.830 BACK MUSCLE SPASM: Primary | ICD-10-CM

## 2022-11-16 PROCEDURE — 99213 OFFICE O/P EST LOW 20 MIN: CPT | Performed by: FAMILY MEDICINE

## 2022-11-16 NOTE — PROGRESS NOTES
2022   Kale Paulson (: 2000) is a 23 y.o. female, established patient, here for evaluation of the following chief complaint(s):  Follow-up (6 months follow up and right side muscle spasm only when stretching - Started 2 weeks ago on and off )     ASSESSMENT/PLAN:  Below is the assessment and plan developed based on review of pertinent history, physical exam, labs, studies, and medications. 1. Back muscle spasm  -     REFERRAL TO PHYSICAL THERAPY  -     XR SPINE LUMB 2 OR 3 V; Future  -     METABOLIC PANEL, COMPREHENSIVE; Future    Return in about 3 months (around 2023) for follow up. SUBJECTIVE/OBJECTIVE:  HPI     1. Back muscle spasm  Patient reports intermittent muscle spasms in thoracic back. This started 2 weeks ago. Spasms is only for a few seconds and then resolves on its own. Patient is a dental assistant. She reports sometimes she does not have a correct posture that causes her muscle spasms. She denies any trauma fall injury or lifting, pushing or pulling any heavy objects. Pain does not radiate. Patient has not tried any OTC medications. I will refer to PT. She declines Flexeril or any muscle relaxers. I will check x-rays. No results found for any visits on 22. Review of Systems   Constitutional: Negative. HENT: Negative. Eyes: Negative. Respiratory: Negative. Cardiovascular: Negative. Gastrointestinal: Negative. Genitourinary: Negative. Musculoskeletal:  Positive for myalgias. Skin: Negative. Neurological: Negative. Endo/Heme/Allergies: Negative. Psychiatric/Behavioral: Negative. Physical Exam  Vitals and nursing note reviewed. HENT:      Head: Normocephalic and atraumatic. Right Ear: External ear normal.      Left Ear: External ear normal.      Nose: Nose normal.   Eyes:      Conjunctiva/sclera: Conjunctivae normal.   Cardiovascular:      Rate and Rhythm: Normal rate and regular rhythm. Pulmonary:      Effort: Pulmonary effort is normal.      Breath sounds: Normal breath sounds. Abdominal:      General: Bowel sounds are normal. There is no distension. Palpations: Abdomen is soft. Tenderness: There is no abdominal tenderness. Musculoskeletal:         General: Normal range of motion. Cervical back: Normal, normal range of motion and neck supple. Thoracic back: Spasms present. Lumbar back: Normal.   Skin:     General: Skin is warm and dry. Neurological:      General: No focal deficit present. Mental Status: She is alert. /65 (BP 1 Location: Right arm, BP Patient Position: Sitting, BP Cuff Size: Small adult)   Pulse 68   Temp 98.2 °F (36.8 °C) (Temporal)   Resp 20   Ht 5' 6\" (1.676 m)   Wt 154 lb 9.6 oz (70.1 kg)   SpO2 98%   BMI 24.95 kg/m²     Allergies   Allergen Reactions    Pollens Extract Itching       No current outpatient medications on file. No current facility-administered medications for this visit. Past Medical History:   Diagnosis Date    Other ill-defined conditions(799.89)     bronchitis       History reviewed. No pertinent surgical history. Social History:  reports that she has never smoked. She has never used smokeless tobacco. She reports that she does not drink alcohol and does not use drugs. Patient Care Team:  Mortimer Arrow, MD as PCP - General (Family Medicine)  Mortimer Arrow, MD as PCP - [facility]    Problem List  Date Reviewed: 11/16/2022   None           I ADVISED PATIENT TO GO TO ER IF SYMPTOMS WORSEN , CHANGE OR FAILS TO IMPROVE. I have discussed the diagnosis with the patient and the intended plan as seen in the above orders. The patient has received an after-visit summary and questions were answered concerning future plans. I have discussed medication side effects and warnings with the patient as well. The patient agrees and understands above plan.        An electronic signature was used to authenticate this note.   -- Son Miller MD

## 2022-11-16 NOTE — PROGRESS NOTES
Patient stated name &   Chief Complaint   Patient presents with    Follow-up     6 months follow up and right side muscle spasm only when stretching - Started 2 weeks ago on and off         Health Maintenance Due   Topic    Hepatitis C Screening     COVID-19 Vaccine (1)    Flu Vaccine (1)       Wt Readings from Last 3 Encounters:   22 154 lb 9.6 oz (70.1 kg)   22 154 lb (69.9 kg) (83 %, Z= 0.97)*   03/15/22 154 lb (69.9 kg) (84 %, Z= 0.98)*     * Growth percentiles are based on CDC (Girls, 2-20 Years) data. Temp Readings from Last 3 Encounters:   22 98.2 °F (36.8 °C) (Temporal)   22 98.3 °F (36.8 °C) (Temporal)   03/15/22 96.8 °F (36 °C) (Skin)     BP Readings from Last 3 Encounters:   22 103/65   22 106/64   03/15/22 (!) 99/59     Pulse Readings from Last 3 Encounters:   22 68   22 71   03/15/22 91         Learning Assessment:  :     Learning Assessment 10/1/2019   PRIMARY LEARNER Patient   HIGHEST LEVEL OF EDUCATION - PRIMARY LEARNER  GRADUATED HIGH SCHOOL OR GED   BARRIERS PRIMARY LEARNER NONE   CO-LEARNER CAREGIVER No   PRIMARY LANGUAGE ENGLISH    NEED No   LEARNER PREFERENCE PRIMARY OTHER (COMMENT)   LEARNING SPECIAL TOPICS no   ANSWERED BY self   RELATIONSHIP SELF       Depression Screening:  :     3 most recent PHQ Screens 2022   Little interest or pleasure in doing things Not at all   Feeling down, depressed, irritable, or hopeless Not at all   Total Score PHQ 2 0       Fall Risk Assessment:  :     No flowsheet data found. Abuse Screening:  :     Abuse Screening Questionnaire 3/15/2022   Do you ever feel afraid of your partner? N   Are you in a relationship with someone who physically or mentally threatens you? N   Is it safe for you to go home? Y       Coordination of Care Questionnaire:  :   1. \"Have you been to the ER, urgent care clinic since your last visit? Hospitalized since your last visit? \" No    2.  \"Have you seen or consulted any other health care providers outside of the [address] [place] [address] since your last visit? \" No     3. For patients aged 39-70: Has the patient had a colonoscopy / FIT/ Cologuard? No      If the patient is female:    4. For patients aged 41-77: Has the patient had a mammogram within the past 2 years? No      5. For patients aged 21-65: Has the patient had a pap smear? No     3) Do you have an Advance Directive on file? no  Are you interested in receiving information about Advance Directives? no    Patient is accompanied by self I have received verbal consent from Genna Dukes to discuss any/all medical information while they are present in the room.

## 2022-11-17 LAB
ALBUMIN SERPL-MCNC: 3.6 G/DL (ref 3.5–5)
ALBUMIN/GLOB SERPL: 0.9 {RATIO} (ref 1.1–2.2)
ALP SERPL-CCNC: 65 U/L (ref 45–117)
ALT SERPL-CCNC: 21 U/L (ref 12–78)
ANION GAP SERPL CALC-SCNC: 6 MMOL/L (ref 5–15)
AST SERPL-CCNC: 11 U/L (ref 15–37)
BILIRUB SERPL-MCNC: 0.8 MG/DL (ref 0.2–1)
BUN SERPL-MCNC: 7 MG/DL (ref 6–20)
BUN/CREAT SERPL: 11 (ref 12–20)
CALCIUM SERPL-MCNC: 9.4 MG/DL (ref 8.5–10.1)
CHLORIDE SERPL-SCNC: 106 MMOL/L (ref 97–108)
CO2 SERPL-SCNC: 29 MMOL/L (ref 21–32)
CREAT SERPL-MCNC: 0.65 MG/DL (ref 0.55–1.02)
GLOBULIN SER CALC-MCNC: 3.8 G/DL (ref 2–4)
GLUCOSE SERPL-MCNC: 89 MG/DL (ref 65–100)
POTASSIUM SERPL-SCNC: 4.2 MMOL/L (ref 3.5–5.1)
PROT SERPL-MCNC: 7.4 G/DL (ref 6.4–8.2)
SODIUM SERPL-SCNC: 141 MMOL/L (ref 136–145)

## 2023-02-16 ENCOUNTER — OFFICE VISIT (OUTPATIENT)
Dept: FAMILY MEDICINE CLINIC | Age: 23
End: 2023-02-16
Payer: MEDICAID

## 2023-02-16 VITALS
RESPIRATION RATE: 16 BRPM | TEMPERATURE: 98.1 F | DIASTOLIC BLOOD PRESSURE: 51 MMHG | WEIGHT: 150 LBS | SYSTOLIC BLOOD PRESSURE: 91 MMHG | BODY MASS INDEX: 24.11 KG/M2 | HEART RATE: 64 BPM | OXYGEN SATURATION: 98 % | HEIGHT: 66 IN

## 2023-02-16 DIAGNOSIS — E01.0 THYROMEGALY: Primary | ICD-10-CM

## 2023-02-16 DIAGNOSIS — M62.830 BACK MUSCLE SPASM: ICD-10-CM

## 2023-02-16 DIAGNOSIS — B00.1 RECURRENT COLD SORES: ICD-10-CM

## 2023-02-16 PROCEDURE — 99213 OFFICE O/P EST LOW 20 MIN: CPT | Performed by: FAMILY MEDICINE

## 2023-02-16 NOTE — PROGRESS NOTES
Identified pt with two pt identifiers(name and ). Reviewed record in preparation for visit and have obtained necessary documentation. Chief Complaint   Patient presents with    Follow-up     X3 month; blood work         Health Maintenance Due   Topic    Hepatitis C Screening     COVID-19 Vaccine (1)    Flu Vaccine (1)       Coordination of Care Questionnaire:  :   1) Have you been to an emergency room, urgent care, or hospitalized since your last visit? If yes, where when, and reason for visit? no       2. Have seen or consulted any other health care provider since your last visit? If yes, where when, and reason for visit? NO        Patient is accompanied by self I have received verbal consent from Chantell Ronquillo to discuss any/all medical information while they are present in the room.

## 2023-02-16 NOTE — PROGRESS NOTES
2023   Adelita Rosenbaum (: 2000) is a 23 y.o. female, established patient, here for evaluation of the following chief complaint(s):  Follow-up (X3 month; blood work )     ASSESSMENT/PLAN:  Below is the assessment and plan developed based on review of pertinent history, physical exam, labs, studies, and medications. 1. Thyromegaly  -     US THYROID/PARATHYROID/SOFT TISS; Future  2. Back muscle spasm  3. Recurrent cold sores    Return if symptoms worsen or fail to improve. SUBJECTIVE/OBJECTIVE:  HPI     1. Thyromegaly  Thyroid nodule to be mildly enlarged on exam.  Check ultrasound. TSH checked in May was within normal limits. Patient denies any symptoms of palpitations anxiety constipation or hair loss or fatigue. 2. Back muscle spasm  Patient reports symptoms of back muscle spasm has resolved. Patient did not get x-ray done. X-ray orders are still active in chart. Patient did not go for physical therapy. I have advised patient to get x-rays done if she develops back pain again. 3. Recurrent cold sores  Patient reports recurrent cold sores on her lips. Patient declines HSV testing today. She has been using Abreva over-the-counter. No results found for any visits on 23. Review of Systems   Constitutional: Negative. HENT: Negative. Eyes: Negative. Respiratory: Negative. Cardiovascular: Negative. Gastrointestinal: Negative. Genitourinary: Negative. Musculoskeletal:  Positive for back pain. Skin: Negative. Neurological: Negative. Endo/Heme/Allergies: Negative. Psychiatric/Behavioral: Negative. Physical Exam  Vitals and nursing note reviewed. HENT:      Head: Normocephalic and atraumatic. Right Ear: External ear normal.      Left Ear: External ear normal.      Nose: Nose normal.   Eyes:      Conjunctiva/sclera: Conjunctivae normal.   Neck:      Thyroid: Thyromegaly present.    Cardiovascular:      Rate and Rhythm: Normal rate and regular rhythm. Pulmonary:      Effort: Pulmonary effort is normal.      Breath sounds: Normal breath sounds. Abdominal:      General: Bowel sounds are normal. There is no distension. Palpations: Abdomen is soft. Tenderness: There is no abdominal tenderness. Musculoskeletal:         General: Normal range of motion. Cervical back: Normal range of motion and neck supple. Right lower leg: No edema. Left lower leg: No edema. Skin:     General: Skin is warm and dry. Neurological:      General: No focal deficit present. Mental Status: She is alert. BP (!) 91/51 (BP 1 Location: Left upper arm, BP Patient Position: Sitting, BP Cuff Size: Adult)   Pulse 64   Temp 98.1 °F (36.7 °C) (Temporal)   Resp 16   Ht 5' 6\" (1.676 m)   Wt 150 lb (68 kg)   SpO2 98%   BMI 24.21 kg/m²     Allergies   Allergen Reactions    Pollens Extract Itching       No current outpatient medications on file. No current facility-administered medications for this visit. Past Medical History:   Diagnosis Date    Other ill-defined conditions(799.89)     bronchitis       History reviewed. No pertinent surgical history. Social History:  reports that she has never smoked. She has never used smokeless tobacco. She reports that she does not drink alcohol and does not use drugs. Patient Care Team:  Ap Saldivar MD as PCP - General (Family Medicine)  Ap Saldivar MD as PCP - [facility] Provider    Problem List  Date Reviewed: 2/16/2023            Codes Class Noted    Recurrent cold sores ICD-10-CM: B00.1  ICD-9-CM: 054.9  2/16/2023                I ADVISED PATIENT TO GO TO ER IF SYMPTOMS WORSEN , CHANGE OR FAILS TO IMPROVE. I have discussed the diagnosis with the patient and the intended plan as seen in the above orders. The patient has received an after-visit summary and questions were answered concerning future plans.   I have discussed medication side effects and warnings with the patient as well. The patient agrees and understands above plan. An electronic signature was used to authenticate this note.   -- Carlos Hansen MD

## 2023-03-02 ENCOUNTER — HOSPITAL ENCOUNTER (OUTPATIENT)
Dept: GENERAL RADIOLOGY | Age: 23
End: 2023-03-02
Attending: FAMILY MEDICINE
Payer: MEDICAID

## 2023-03-02 ENCOUNTER — HOSPITAL ENCOUNTER (OUTPATIENT)
Dept: ULTRASOUND IMAGING | Age: 23
Discharge: HOME | End: 2023-03-02
Attending: FAMILY MEDICINE
Payer: MEDICAID

## 2023-03-02 DIAGNOSIS — M62.830 BACK MUSCLE SPASM: ICD-10-CM

## 2023-03-02 DIAGNOSIS — E01.0 THYROMEGALY: ICD-10-CM

## 2023-03-02 PROCEDURE — 72100 X-RAY EXAM L-S SPINE 2/3 VWS: CPT

## 2023-03-02 PROCEDURE — 76536 US EXAM OF HEAD AND NECK: CPT

## 2023-05-22 ENCOUNTER — TELEPHONE (OUTPATIENT)
Age: 23
End: 2023-05-22

## 2023-09-18 ENCOUNTER — OFFICE VISIT (OUTPATIENT)
Age: 23
End: 2023-09-18
Payer: MEDICAID

## 2023-09-18 VITALS
RESPIRATION RATE: 19 BRPM | HEIGHT: 66 IN | DIASTOLIC BLOOD PRESSURE: 60 MMHG | BODY MASS INDEX: 25.23 KG/M2 | HEART RATE: 88 BPM | TEMPERATURE: 98 F | SYSTOLIC BLOOD PRESSURE: 99 MMHG | OXYGEN SATURATION: 98 % | WEIGHT: 157 LBS

## 2023-09-18 DIAGNOSIS — Z00.00 PHYSICAL EXAM: Primary | ICD-10-CM

## 2023-09-18 DIAGNOSIS — H61.23 IMPACTED CERUMEN, BILATERAL: ICD-10-CM

## 2023-09-18 PROCEDURE — 99385 PREV VISIT NEW AGE 18-39: CPT | Performed by: NURSE PRACTITIONER

## 2023-09-18 SDOH — HEALTH STABILITY: PHYSICAL HEALTH: ON AVERAGE, HOW MANY MINUTES DO YOU ENGAGE IN EXERCISE AT THIS LEVEL?: 0 MIN

## 2023-09-18 SDOH — ECONOMIC STABILITY: FOOD INSECURITY: WITHIN THE PAST 12 MONTHS, YOU WORRIED THAT YOUR FOOD WOULD RUN OUT BEFORE YOU GOT MONEY TO BUY MORE.: NEVER TRUE

## 2023-09-18 SDOH — ECONOMIC STABILITY: FOOD INSECURITY: WITHIN THE PAST 12 MONTHS, THE FOOD YOU BOUGHT JUST DIDN'T LAST AND YOU DIDN'T HAVE MONEY TO GET MORE.: NEVER TRUE

## 2023-09-18 SDOH — ECONOMIC STABILITY: HOUSING INSECURITY
IN THE LAST 12 MONTHS, WAS THERE A TIME WHEN YOU DID NOT HAVE A STEADY PLACE TO SLEEP OR SLEPT IN A SHELTER (INCLUDING NOW)?: NO

## 2023-09-18 SDOH — ECONOMIC STABILITY: INCOME INSECURITY: HOW HARD IS IT FOR YOU TO PAY FOR THE VERY BASICS LIKE FOOD, HOUSING, MEDICAL CARE, AND HEATING?: NOT HARD AT ALL

## 2023-09-18 ASSESSMENT — PATIENT HEALTH QUESTIONNAIRE - PHQ9
1. LITTLE INTEREST OR PLEASURE IN DOING THINGS: 0
SUM OF ALL RESPONSES TO PHQ QUESTIONS 1-9: 0
SUM OF ALL RESPONSES TO PHQ QUESTIONS 1-9: 0
SUM OF ALL RESPONSES TO PHQ9 QUESTIONS 1 & 2: 0
SUM OF ALL RESPONSES TO PHQ QUESTIONS 1-9: 0
2. FEELING DOWN, DEPRESSED OR HOPELESS: 0
SUM OF ALL RESPONSES TO PHQ QUESTIONS 1-9: 0

## 2023-09-29 DIAGNOSIS — Z00.00 PHYSICAL EXAM: ICD-10-CM

## 2023-09-29 LAB
ALBUMIN SERPL-MCNC: 3.6 G/DL (ref 3.5–5)
ALBUMIN/GLOB SERPL: 0.9 (ref 1.1–2.2)
ALP SERPL-CCNC: 64 U/L (ref 45–117)
ALT SERPL-CCNC: 18 U/L (ref 12–78)
ANION GAP SERPL CALC-SCNC: 4 MMOL/L (ref 5–15)
AST SERPL-CCNC: 12 U/L (ref 15–37)
BASOPHILS # BLD: 0 K/UL (ref 0–0.1)
BASOPHILS NFR BLD: 1 % (ref 0–1)
BILIRUB SERPL-MCNC: 1 MG/DL (ref 0.2–1)
BUN SERPL-MCNC: 9 MG/DL (ref 6–20)
BUN/CREAT SERPL: 16 (ref 12–20)
CALCIUM SERPL-MCNC: 9.6 MG/DL (ref 8.5–10.1)
CHLORIDE SERPL-SCNC: 107 MMOL/L (ref 97–108)
CHOLEST SERPL-MCNC: 134 MG/DL
CO2 SERPL-SCNC: 25 MMOL/L (ref 21–32)
CREAT SERPL-MCNC: 0.57 MG/DL (ref 0.55–1.02)
DIFFERENTIAL METHOD BLD: ABNORMAL
EOSINOPHIL # BLD: 0.2 K/UL (ref 0–0.4)
EOSINOPHIL NFR BLD: 4 % (ref 0–7)
ERYTHROCYTE [DISTWIDTH] IN BLOOD BY AUTOMATED COUNT: 12.5 % (ref 11.5–14.5)
EST. AVERAGE GLUCOSE BLD GHB EST-MCNC: 97 MG/DL
GLOBULIN SER CALC-MCNC: 3.9 G/DL (ref 2–4)
GLUCOSE SERPL-MCNC: 87 MG/DL (ref 65–100)
HBA1C MFR BLD: 5 % (ref 4–5.6)
HCT VFR BLD AUTO: 37.9 % (ref 35–47)
HCV AB SER IA-ACNC: 0.11 INDEX
HCV AB SERPL QL IA: NONREACTIVE
HDLC SERPL-MCNC: 75 MG/DL
HDLC SERPL: 1.8 (ref 0–5)
HGB BLD-MCNC: 12.8 G/DL (ref 11.5–16)
HIV 1+2 AB+HIV1 P24 AG SERPL QL IA: NONREACTIVE
HIV 1/2 RESULT COMMENT: NORMAL
IMM GRANULOCYTES # BLD AUTO: 0 K/UL (ref 0–0.04)
IMM GRANULOCYTES NFR BLD AUTO: 0 % (ref 0–0.5)
LDLC SERPL CALC-MCNC: 52.8 MG/DL (ref 0–100)
LYMPHOCYTES # BLD: 1.9 K/UL (ref 0.8–3.5)
LYMPHOCYTES NFR BLD: 47 % (ref 12–49)
MCH RBC QN AUTO: 29.3 PG (ref 26–34)
MCHC RBC AUTO-ENTMCNC: 33.8 G/DL (ref 30–36.5)
MCV RBC AUTO: 86.7 FL (ref 80–99)
MONOCYTES # BLD: 0.3 K/UL (ref 0–1)
MONOCYTES NFR BLD: 7 % (ref 5–13)
NEUTS SEG # BLD: 1.7 K/UL (ref 1.8–8)
NEUTS SEG NFR BLD: 41 % (ref 32–75)
NRBC # BLD: 0 K/UL (ref 0–0.01)
NRBC BLD-RTO: 0 PER 100 WBC
PLATELET # BLD AUTO: 207 K/UL (ref 150–400)
PMV BLD AUTO: 11.8 FL (ref 8.9–12.9)
POTASSIUM SERPL-SCNC: 3.9 MMOL/L (ref 3.5–5.1)
PROT SERPL-MCNC: 7.5 G/DL (ref 6.4–8.2)
RBC # BLD AUTO: 4.37 M/UL (ref 3.8–5.2)
SODIUM SERPL-SCNC: 136 MMOL/L (ref 136–145)
TRIGL SERPL-MCNC: 31 MG/DL
TSH SERPL DL<=0.05 MIU/L-ACNC: 1.43 UIU/ML (ref 0.36–3.74)
VLDLC SERPL CALC-MCNC: 6.2 MG/DL
WBC # BLD AUTO: 4 K/UL (ref 3.6–11)

## 2023-10-03 LAB
C TRACH RRNA SPEC QL NAA+PROBE: NEGATIVE
N GONORRHOEA RRNA SPEC QL NAA+PROBE: NEGATIVE
SPECIMEN SOURCE: NORMAL

## 2024-08-29 ENCOUNTER — OFFICE VISIT (OUTPATIENT)
Age: 24
End: 2024-08-29
Payer: MEDICAID

## 2024-08-29 VITALS
HEART RATE: 71 BPM | RESPIRATION RATE: 16 BRPM | OXYGEN SATURATION: 98 % | TEMPERATURE: 98 F | BODY MASS INDEX: 25.46 KG/M2 | SYSTOLIC BLOOD PRESSURE: 109 MMHG | DIASTOLIC BLOOD PRESSURE: 67 MMHG | WEIGHT: 158.4 LBS | HEIGHT: 66 IN

## 2024-08-29 DIAGNOSIS — Z00.00 ENCOUNTER FOR WELL ADULT EXAM WITHOUT ABNORMAL FINDINGS: ICD-10-CM

## 2024-08-29 DIAGNOSIS — Z00.00 ENCOUNTER FOR WELL ADULT EXAM WITHOUT ABNORMAL FINDINGS: Primary | ICD-10-CM

## 2024-08-29 DIAGNOSIS — R00.2 PALPITATIONS: ICD-10-CM

## 2024-08-29 PROCEDURE — 90715 TDAP VACCINE 7 YRS/> IM: CPT | Performed by: NURSE PRACTITIONER

## 2024-08-29 PROCEDURE — 99395 PREV VISIT EST AGE 18-39: CPT | Performed by: NURSE PRACTITIONER

## 2024-08-29 ASSESSMENT — PATIENT HEALTH QUESTIONNAIRE - PHQ9
SUM OF ALL RESPONSES TO PHQ QUESTIONS 1-9: 0
1. LITTLE INTEREST OR PLEASURE IN DOING THINGS: NOT AT ALL
SUM OF ALL RESPONSES TO PHQ QUESTIONS 1-9: 0
SUM OF ALL RESPONSES TO PHQ9 QUESTIONS 1 & 2: 0
2. FEELING DOWN, DEPRESSED OR HOPELESS: NOT AT ALL
SUM OF ALL RESPONSES TO PHQ QUESTIONS 1-9: 0
SUM OF ALL RESPONSES TO PHQ QUESTIONS 1-9: 0

## 2024-08-29 NOTE — PROGRESS NOTES
I have reviewed all needed documentation in preparation for visit. Verified patient by name and date of birth  Chief Complaint   Patient presents with    Annual Exam     Concerns with very infrequent palpitations        Vitals:    08/29/24 1135   BP: 109/67   Pulse: 71   Resp: 16   Temp: 98 °F (36.7 °C)   TempSrc: Temporal   SpO2: 98%   Weight: 71.8 kg (158 lb 6.4 oz)   Height: 1.676 m (5' 6\")       Health Maintenance Due   Topic Date Due    Pap smear  Never done    DTaP/Tdap/Td vaccine (7 - Td or Tdap) 08/16/2023    COVID-19 Vaccine (1 - 2023-24 season) Never done    Flu vaccine (1) 08/01/2024    Depression Screen  09/18/2024    Chlamydia/GC screen  09/29/2024     \"Have you been to the ER, urgent care clinic since your last visit?  Hospitalized since your last visit?\"    NO    “Have you seen or consulted any other health care providers outside of [facility] since your last visit?”    NO     “Have you had a pap smear?”    YES - Where: [facility] with Dr. Roopa Wong     No cervical cancer screening on file : January 2023'            Click Here for Release of Records Request         KHANH Márquez

## 2024-08-29 NOTE — PROGRESS NOTES
Well Adult Note  Name: Alpa Patrick Today’s Date: 2024   MRN: [id number] Sex: Female   Age: 24 y.o. Ethnicity: Non- / Non    : 2000 Race: Black /       Alpa Patrick is here for a well adult exam. Women's health done elsewhere.  agustinmelani Mclaughlin Marvin.  Age 23  VPW       Subjective   History: None    Concern of palpitations/ flutter rare.   Need for tetanus update   Healthy diet  Exercise - mostly walking.    Works dental assistance [employer]     Review of Systems  Review of Systems  Constitutional:  negative for fevers, chills, anorexia and weight loss  Eyes:                negative for visual disturbance and irritation  ENT:                 negative for tinnitus,sore throat,nasal congestion,ear pains.hoarseness  Respiratory:     negative for cough, hemoptysis, dyspnea,wheezing  CV:                   negative for chest pain, lower extremity edema  GI:                    negative for nausea, vomiting, diarrhea, abdominal pain,melena  Endo:               negative for polyuria,polydipsia,polyphagia,heat intolerance  Genitourinary : negative for frequency, dysuria and hematuria  Integumentary: negative for rash and pruritus  Hematologic:   negative for easy bruising and gum/nose bleeding  Musculoskel:   negative for myalgias, arthralgias, back pain, muscle weakness, joint pain  Neurological:   negative for headaches, dizziness, vertigo, memory problems and gait   Behavl/Psych:  negative for feelings of anxiety, depression, mood changes  ROS otherwise negative          Allergies   Allergen Reactions    Pollen Extract Itching     Prior to Visit Medications    Not on File     History reviewed. No pertinent past medical history.  History reviewed. No pertinent surgical history.  Family History   Problem Relation Age of Onset    Hypertension Mother     Substance Abuse Maternal Uncle     Diabetes Maternal Grandmother     Hypertension Maternal Grandmother     Arthritis  Maternal Grandmother     High Cholesterol Maternal Grandmother      Social History     Tobacco Use    Smoking status: Never    Smokeless tobacco: Never   Vaping Use    Vaping status: Never Used   Substance Use Topics    Alcohol use: Yes     Alcohol/week: 1.0 standard drink of alcohol     Types: 1 Glasses of wine per week    Drug use: No           Objective   Vital Signs  /67   Pulse 71   Temp 98 °F (36.7 °C) (Temporal)   Resp 16   Ht 1.676 m (5' 6\")   Wt 71.8 kg (158 lb 6.4 oz)   LMP 08/04/2024 (Approximate)   SpO2 98%   BMI 25.57 kg/m²     Wt Readings from Last 3 Encounters:   08/29/24 71.8 kg (158 lb 6.4 oz)   09/18/23 71.2 kg (157 lb)   02/16/23 68 kg (150 lb)       Physical Exam   Vitals signs reviewed.   Constitutional:   Appearance: Pt is well-developed. is not ill-appearing.   HENT:   Head: Normocephalic and atraumatic.   Right Ear: Tympanic membrane and ear canal normal. Left Ear: Tympanic membrane and ear canal normal.   Nose: Nose normal. No rhinorrhea.   Mouth/Throat: Mouth: No oral lesions. Dentition: Normal dentition.   Pharynx: Uvula midline. No oropharyngeal exudate.   Eyes: General: Lids are normal.   Conjunctiva/sclera: Conjunctivae normal.   Pupils: Pupils are equal, round, and reactive to light.   Neck: Musculoskeletal: Normal range of motion and neck supple. Thyroid: No thyromegaly.   Trachea: Trachea normal.   Cardiovascular: Rate and Rhythm: Normal rate and regular rhythm. Heart sounds: Normal heart sounds. No murmur.   Pulmonary: Effort: Pulmonary effort is normal. No respiratory distress. Breath sounds: Normal breath sounds.   Abdominal: General: Bowel sounds are normal. Palpations: Abdomen is soft. Abdomen is not rigid.   Tenderness: There is no abdominal tenderness. There is no guarding.   Musculoskeletal: Normal range of motion.   Lymphadenopathy: Cervical: No cervical adenopathy.   Skin:General: Skin is warm and dry. No rash.   Neurological: Mental Status:  Pt is alert and

## 2024-08-30 ENCOUNTER — TELEPHONE (OUTPATIENT)
Age: 24
End: 2024-08-30

## 2024-08-30 LAB
ALBUMIN SERPL-MCNC: 3.9 G/DL (ref 3.5–5)
ALBUMIN/GLOB SERPL: 1 (ref 1.1–2.2)
ALP SERPL-CCNC: 66 U/L (ref 45–117)
ALT SERPL-CCNC: 22 U/L (ref 12–78)
ANION GAP SERPL CALC-SCNC: 7 MMOL/L (ref 5–15)
AST SERPL-CCNC: 17 U/L (ref 15–37)
BASOPHILS # BLD: 0 K/UL (ref 0–0.1)
BASOPHILS NFR BLD: 1 % (ref 0–1)
BILIRUB SERPL-MCNC: 1.6 MG/DL (ref 0.2–1)
BUN SERPL-MCNC: 7 MG/DL (ref 6–20)
BUN/CREAT SERPL: 11 (ref 12–20)
CALCIUM SERPL-MCNC: 9.9 MG/DL (ref 8.5–10.1)
CHLORIDE SERPL-SCNC: 105 MMOL/L (ref 97–108)
CO2 SERPL-SCNC: 25 MMOL/L (ref 21–32)
CREAT SERPL-MCNC: 0.65 MG/DL (ref 0.55–1.02)
DIFFERENTIAL METHOD BLD: ABNORMAL
EOSINOPHIL # BLD: 0.1 K/UL (ref 0–0.4)
EOSINOPHIL NFR BLD: 2 % (ref 0–7)
ERYTHROCYTE [DISTWIDTH] IN BLOOD BY AUTOMATED COUNT: 12.3 % (ref 11.5–14.5)
GLOBULIN SER CALC-MCNC: 4 G/DL (ref 2–4)
GLUCOSE SERPL-MCNC: 77 MG/DL (ref 65–100)
HCT VFR BLD AUTO: 41.2 % (ref 35–47)
HGB BLD-MCNC: 13.9 G/DL (ref 11.5–16)
IMM GRANULOCYTES # BLD AUTO: 0 K/UL (ref 0–0.04)
IMM GRANULOCYTES NFR BLD AUTO: 1 % (ref 0–0.5)
LYMPHOCYTES # BLD: 2.2 K/UL (ref 0.8–3.5)
LYMPHOCYTES NFR BLD: 51 % (ref 12–49)
MCH RBC QN AUTO: 29.1 PG (ref 26–34)
MCHC RBC AUTO-ENTMCNC: 33.7 G/DL (ref 30–36.5)
MCV RBC AUTO: 86.4 FL (ref 80–99)
MONOCYTES # BLD: 0.4 K/UL (ref 0–1)
MONOCYTES NFR BLD: 9 % (ref 5–13)
NEUTS SEG # BLD: 1.5 K/UL (ref 1.8–8)
NEUTS SEG NFR BLD: 36 % (ref 32–75)
NRBC # BLD: 0 K/UL (ref 0–0.01)
NRBC BLD-RTO: 0 PER 100 WBC
PLATELET # BLD AUTO: 183 K/UL (ref 150–400)
PMV BLD AUTO: 11.9 FL (ref 8.9–12.9)
POTASSIUM SERPL-SCNC: 3.8 MMOL/L (ref 3.5–5.1)
PROT SERPL-MCNC: 7.9 G/DL (ref 6.4–8.2)
RBC # BLD AUTO: 4.77 M/UL (ref 3.8–5.2)
SODIUM SERPL-SCNC: 137 MMOL/L (ref 136–145)
TSH SERPL DL<=0.05 MIU/L-ACNC: 0.96 UIU/ML (ref 0.36–3.74)
WBC # BLD AUTO: 4.3 K/UL (ref 3.6–11)

## 2024-11-20 SDOH — ECONOMIC STABILITY: FOOD INSECURITY: WITHIN THE PAST 12 MONTHS, THE FOOD YOU BOUGHT JUST DIDN'T LAST AND YOU DIDN'T HAVE MONEY TO GET MORE.: NEVER TRUE

## 2024-11-20 SDOH — ECONOMIC STABILITY: FOOD INSECURITY: WITHIN THE PAST 12 MONTHS, YOU WORRIED THAT YOUR FOOD WOULD RUN OUT BEFORE YOU GOT MONEY TO BUY MORE.: NEVER TRUE

## 2024-11-20 SDOH — ECONOMIC STABILITY: INCOME INSECURITY: HOW HARD IS IT FOR YOU TO PAY FOR THE VERY BASICS LIKE FOOD, HOUSING, MEDICAL CARE, AND HEATING?: NOT HARD AT ALL

## 2024-11-20 SDOH — ECONOMIC STABILITY: TRANSPORTATION INSECURITY
IN THE PAST 12 MONTHS, HAS LACK OF TRANSPORTATION KEPT YOU FROM MEETINGS, WORK, OR FROM GETTING THINGS NEEDED FOR DAILY LIVING?: NO

## 2024-11-21 ENCOUNTER — OFFICE VISIT (OUTPATIENT)
Age: 24
End: 2024-11-21
Payer: MEDICAID

## 2024-11-21 VITALS
TEMPERATURE: 98.2 F | WEIGHT: 160 LBS | BODY MASS INDEX: 25.71 KG/M2 | DIASTOLIC BLOOD PRESSURE: 64 MMHG | HEART RATE: 64 BPM | OXYGEN SATURATION: 99 % | HEIGHT: 66 IN | SYSTOLIC BLOOD PRESSURE: 101 MMHG | RESPIRATION RATE: 18 BRPM

## 2024-11-21 DIAGNOSIS — B00.9 HERPES SIMPLEX: Primary | ICD-10-CM

## 2024-11-21 PROCEDURE — 99213 OFFICE O/P EST LOW 20 MIN: CPT | Performed by: NURSE PRACTITIONER

## 2024-11-21 RX ORDER — ASCORBIC ACID 500 MG
500 TABLET ORAL DAILY
COMMUNITY

## 2024-11-21 RX ORDER — VALACYCLOVIR HYDROCHLORIDE 1 G/1
TABLET, FILM COATED ORAL
Qty: 30 TABLET | Refills: 2 | Status: SHIPPED | OUTPATIENT
Start: 2024-11-21

## 2024-11-21 ASSESSMENT — ENCOUNTER SYMPTOMS
TROUBLE SWALLOWING: 0
SORE THROAT: 0

## 2024-11-21 NOTE — PROGRESS NOTES
Alpa Patrick is a 24 y.o. female  Chief Complaint   Patient presents with    Follow-up       Vitals:    11/21/24 1429   BP: 101/64   Pulse: 64   Resp: 18   Temp: 98.2 °F (36.8 °C)   SpO2: 99%      Wt Readings from Last 3 Encounters:   11/21/24 72.6 kg (160 lb)   08/29/24 71.8 kg (158 lb 6.4 oz)   09/18/23 71.2 kg (157 lb)     BMI Readings from Last 3 Encounters:   11/21/24 25.82 kg/m²   08/29/24 25.57 kg/m²   09/18/23 25.34 kg/m²     Health Maintenance Due   Topic Date Due    Pap smear  Never done    Flu vaccine (1) 08/01/2024    COVID-19 Vaccine (1 - 2023-24 season) Never done    Chlamydia/GC screen  09/29/2024     HPI  History of cold sores.  Recent  more outbreaks.   Comes out when sick or stresses.   Only mouth.  Abreva.     Takes vitamin C.    Eats healthy/ works out.  Usual right corner of mouth.   Denies recent illness. Increase stress, poor diet, recent exposure.   ROS  Review of Systems   Constitutional:  Negative for fatigue and fever.   HENT:  Negative for sore throat and trouble swallowing.    Musculoskeletal:  Negative for arthralgias and neck pain.   Skin:  Positive for rash.   Allergic/Immunologic: Negative for environmental allergies and immunocompromised state.       EXAM  Physical Exam  Vitals and nursing note reviewed.   Constitutional:       General: She is not in acute distress.     Appearance: Normal appearance. She is normal weight.   HENT:      Head: Normocephalic and atraumatic.      Mouth/Throat:      Lips: Lesions present.      Mouth: Mucous membranes are moist. No oral lesions.      Dentition: Normal dentition.      Tongue: No lesions.        Comments: Small 2mm raised erythematous lesion right corner of mouth.  Eyes:      Extraocular Movements: Extraocular movements intact.      Pupils: Pupils are equal, round, and reactive to light.   Cardiovascular:      Rate and Rhythm: Normal rate.   Pulmonary:      Effort: Pulmonary effort is normal.   Musculoskeletal:         General: Normal

## 2025-04-16 ENCOUNTER — TELEPHONE (OUTPATIENT)
Age: 25
End: 2025-04-16

## 2025-04-16 NOTE — TELEPHONE ENCOUNTER
Patient scheduled for an acute visit on 04/17/25. States she is in the process of getting her insurance straight and wanted to know what the out of pocket cost for tomorrow's visit would be if the issues are not resolved in time. I let her know that there is a $40 down payment due at the time of service for self pay patients with the remaining balance being billed to her afterwards. I also let her know that I could not give a specific cost for the visit as that depends on what is done/addressed during the visit. She understood.

## 2025-04-17 ENCOUNTER — OFFICE VISIT (OUTPATIENT)
Age: 25
End: 2025-04-17

## 2025-04-17 VITALS
TEMPERATURE: 97.9 F | HEART RATE: 86 BPM | SYSTOLIC BLOOD PRESSURE: 92 MMHG | BODY MASS INDEX: 26.08 KG/M2 | WEIGHT: 161.6 LBS | RESPIRATION RATE: 18 BRPM | DIASTOLIC BLOOD PRESSURE: 58 MMHG

## 2025-04-17 DIAGNOSIS — D36.9 DERMOID CYST: ICD-10-CM

## 2025-04-17 DIAGNOSIS — M54.9 BACK PAIN WITHOUT SCIATICA: ICD-10-CM

## 2025-04-17 DIAGNOSIS — M62.838 MUSCLE SPASM OF LEFT SHOULDER: Primary | ICD-10-CM

## 2025-04-17 DIAGNOSIS — B00.1 RECURRENT COLD SORES: ICD-10-CM

## 2025-04-17 DIAGNOSIS — M54.2 NECK PAIN WITHOUT INJURY: ICD-10-CM

## 2025-04-17 PROCEDURE — 99214 OFFICE O/P EST MOD 30 MIN: CPT | Performed by: NURSE PRACTITIONER

## 2025-04-17 RX ORDER — VALACYCLOVIR HYDROCHLORIDE 1 G/1
TABLET, FILM COATED ORAL
Qty: 30 TABLET | Refills: 2 | Status: SHIPPED | OUTPATIENT
Start: 2025-04-17

## 2025-04-17 RX ORDER — TIZANIDINE 2 MG/1
2 TABLET ORAL NIGHTLY PRN
Qty: 10 TABLET | Refills: 0 | Status: SHIPPED | OUTPATIENT
Start: 2025-04-17

## 2025-04-17 SDOH — ECONOMIC STABILITY: FOOD INSECURITY: WITHIN THE PAST 12 MONTHS, YOU WORRIED THAT YOUR FOOD WOULD RUN OUT BEFORE YOU GOT MONEY TO BUY MORE.: NEVER TRUE

## 2025-04-17 SDOH — ECONOMIC STABILITY: FOOD INSECURITY: WITHIN THE PAST 12 MONTHS, THE FOOD YOU BOUGHT JUST DIDN'T LAST AND YOU DIDN'T HAVE MONEY TO GET MORE.: NEVER TRUE

## 2025-04-17 ASSESSMENT — PATIENT HEALTH QUESTIONNAIRE - PHQ9
2. FEELING DOWN, DEPRESSED OR HOPELESS: NOT AT ALL
SUM OF ALL RESPONSES TO PHQ QUESTIONS 1-9: 0
1. LITTLE INTEREST OR PLEASURE IN DOING THINGS: NOT AT ALL

## 2025-04-17 ASSESSMENT — ENCOUNTER SYMPTOMS
ABDOMINAL PAIN: 0
BACK PAIN: 1
CONSTIPATION: 0
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
